# Patient Record
Sex: FEMALE | Race: OTHER | Employment: PART TIME
[De-identification: names, ages, dates, MRNs, and addresses within clinical notes are randomized per-mention and may not be internally consistent; named-entity substitution may affect disease eponyms.]

---

## 2024-08-27 ENCOUNTER — APPOINTMENT (OUTPATIENT)
Facility: HOSPITAL | Age: 64
End: 2024-08-27
Payer: COMMERCIAL

## 2024-08-27 ENCOUNTER — HOSPITAL ENCOUNTER (EMERGENCY)
Facility: HOSPITAL | Age: 64
Discharge: HOME | End: 2024-08-27
Attending: EMERGENCY MEDICINE
Payer: COMMERCIAL

## 2024-08-27 VITALS
TEMPERATURE: 98.2 F | WEIGHT: 142.86 LBS | OXYGEN SATURATION: 97 % | HEIGHT: 61 IN | RESPIRATION RATE: 14 BRPM | SYSTOLIC BLOOD PRESSURE: 137 MMHG | DIASTOLIC BLOOD PRESSURE: 73 MMHG | HEART RATE: 73 BPM | BODY MASS INDEX: 26.97 KG/M2

## 2024-08-27 DIAGNOSIS — J10.1 INFLUENZA A: Primary | ICD-10-CM

## 2024-08-27 LAB
FLUAV RNA SPEC QL NAA+PROBE: DETECTED
FLUBV RNA SPEC QL NAA+PROBE: NOT DETECTED
SARS-COV-2 RNA RESP QL NAA+PROBE: NOT DETECTED
SOURCE: ABNORMAL

## 2024-08-27 PROCEDURE — 71046 X-RAY EXAM CHEST 2 VIEWS: CPT

## 2024-08-27 PROCEDURE — 99284 EMERGENCY DEPT VISIT MOD MDM: CPT

## 2024-08-27 PROCEDURE — 87636 SARSCOV2 & INF A&B AMP PRB: CPT

## 2024-08-27 RX ORDER — OSELTAMIVIR PHOSPHATE 75 MG/1
75 CAPSULE ORAL 2 TIMES DAILY
Qty: 10 CAPSULE | Refills: 0 | Status: SHIPPED | OUTPATIENT
Start: 2024-08-27 | End: 2024-09-01

## 2024-08-27 ASSESSMENT — PAIN - FUNCTIONAL ASSESSMENT: PAIN_FUNCTIONAL_ASSESSMENT: NONE - DENIES PAIN

## 2024-08-27 NOTE — ED TRIAGE NOTES
Patient reports developed cough on Saturday and that week on [facility] On Saturday went to [facility] and was prescribed albuterol inhaler with mucinex. Patient was taking tylenol for HA and fever. Reports cough is getting worse, and now have nausea. In addition reports acid reflex. Denies CP, SOB.

## 2024-09-04 ASSESSMENT — ENCOUNTER SYMPTOMS
ABDOMINAL PAIN: 0
VOMITING: 0
FACIAL SWELLING: 0
NAUSEA: 0
EYE DISCHARGE: 0
BACK PAIN: 0
SORE THROAT: 0
CHEST TIGHTNESS: 0
SHORTNESS OF BREATH: 0
EYE PAIN: 0
COUGH: 1
DIARRHEA: 0

## 2024-09-04 NOTE — ED PROVIDER NOTES
BP: 137/73   Pulse: 73   Resp: 14   Temp: 98.2 °F (36.8 °C)   TempSrc: Oral   SpO2: 97%   Weight: 64.8 kg (142 lb 13.7 oz)   Height: 1.549 m (5' 1\")           Medical Decision Making  Influenza is positive today.  COVID is negative.  Patient is put on Tamiflu and discharged home.    Amount and/or Complexity of Data Reviewed  Radiology: ordered.    Risk  Prescription drug management.            REASSESSMENT            CONSULTS:  None    PROCEDURES:  Unless otherwise noted below, none     Procedures      FINAL IMPRESSION      1. Influenza A          DISPOSITION/PLAN   DISPOSITION Decision To Discharge 08/27/2024 08:23:21 PM      PATIENT REFERRED TO:  [facility]  [id number] [address] [id number] [phone]    As needed, If symptoms worsen      DISCHARGE MEDICATIONS:  Discharge Medication List as of 8/27/2024  8:24 PM        START taking these medications    Details   oseltamivir (TAMIFLU) 75 MG capsule Take 1 capsule by mouth 2 times daily for 5 days, Disp-10 capsule, R-0Normal               (Please note that portions of this note were completed with a voice recognition program.  Efforts were made to edit the dictations but occasionally words are mis-transcribed.)    Jorge Benítez MD (electronically signed)  Emergency Attending Physician / Physician Assistant / Nurse Practitioner              Jorge Benítez MD  09/04/24 0738